# Patient Record
Sex: FEMALE | Race: WHITE | URBAN - METROPOLITAN AREA
[De-identification: names, ages, dates, MRNs, and addresses within clinical notes are randomized per-mention and may not be internally consistent; named-entity substitution may affect disease eponyms.]

---

## 2017-11-06 ENCOUNTER — THERAPY VISIT (OUTPATIENT)
Dept: PHYSICAL THERAPY | Facility: CLINIC | Age: 37
End: 2017-11-06
Payer: COMMERCIAL

## 2017-11-06 DIAGNOSIS — G89.29 CHRONIC RIGHT SHOULDER PAIN: Primary | ICD-10-CM

## 2017-11-06 DIAGNOSIS — M25.511 CHRONIC RIGHT SHOULDER PAIN: Primary | ICD-10-CM

## 2017-11-06 PROCEDURE — 97161 PT EVAL LOW COMPLEX 20 MIN: CPT | Mod: GP | Performed by: PHYSICAL THERAPIST

## 2017-11-06 PROCEDURE — 97530 THERAPEUTIC ACTIVITIES: CPT | Mod: GP | Performed by: PHYSICAL THERAPIST

## 2017-11-06 PROCEDURE — 97110 THERAPEUTIC EXERCISES: CPT | Mod: GP | Performed by: PHYSICAL THERAPIST

## 2017-11-06 NOTE — LETTER
[facility] ATHLETIC [facility] PHYSICAL THERAPY   [address] [address]  [phone]    2017    Re: Anna Flowers   :   1980  MRN:  [id number]   REFERRING PHYSICIAN: Margaret Yu MD    [facility] ATHLETIC [facility] PHYSICAL [facility]    Date of Initial Evaluation: 2017  Visits:  Rxs Used: 1  Reason for Referral:  Chronic right shoulder pain    EVALUATION SUMMARY    [facility] Initial Evaluation    Subjective:  Patient is a 37 year old female presenting with rehab right shoulder hpi.   Anna Flowers is a 37 year old female with a right shoulder condition.  Condition occurred with:  Repetition/overuse (Pt. has had gradual onset of R sh pain x 5 months w/out incident. The problem seems to coincide with having her baby dtr 5 months ago. She had a similar problem after having her 3 yo. That problem went away on it's own. No other hx of R sh injury.). Condition occurred: for unknown reasons. This is a chronic condition 17.    Patient reports pain: Anterior. Radiates to: Upper arm. Pain is described as aching and is intermittent and reported as 4/10. Associated symptoms: Loss of motion/stiffness and loss of strength. Pain is worse in the P.M. Symptoms are exacerbated by using arm overhead, lifting and carrying and relieved by NSAID's and rest. Since onset symptoms are unchanged. Special testing: none. Previous treatment: none. General health as reported by patient is excellent. Other surgeries include: Orthopedic surgery (hand surgery). Current medications: Other (advil, vitamin). Current occupation is Retail management. Primary job tasks include: Prolonged standing, lifting and other (pushing/pulling).                Objective:    Standing Alignment:      Shoulder/UE:  Normal    Flexibility/Screens:   Positive screens:  Shoulder                  Re: Anna Flowers   :   1980    Shoulder  Evaluation:  ROM:  AROM:    Flexion:  Left:  175    Right:  170    Abduction:  Left: 175   Right:  175    Internal Rotation:  Left:  85    Right:  85  External Rotation:  Left:  80    Right:  67    Strength:    Flexion: Right: 4-/5      Pain:  +  Extension:  Right: 4/5    Pain:  Abduction:  Right: 4+/5     Pain:    Internal Rotation:  Right: 5/5     Pain:  External Rotation:   Right:4-/5      Pain:+    Horizontal Abduction:  Right:4-/5    Pain:    Stability Testing:  normal    Special Tests:      Right shoulder positive for the following special tests:Impingement  Right shoulder negative for the following special tests:Labral and Rotator cuff tear  Palpation:  normal    Mobility Tests:      Glenohumeral inferior right:  Hypomobile      Assessment/Plan:      Patient is a 37 year old female with right side shoulder complaints.    Patient has the following significant findings with corresponding treatment plan.                Diagnosis 1:  R shoulder pain  Pain -  self management and education  Decreased ROM/flexibility - manual therapy and therapeutic exercise  Decreased strength - therapeutic exercise and therapeutic activities  Impaired muscle performance - neuro re-education  Decreased function - therapeutic activities    Therapy Evaluation Codes:   1) History comprised of:   Personal factors that impact the plan of care:      None.    Comorbidity factors that impact the plan of care are:      None.     Medications impacting care: None.        Re: Anna Flowers   :   1980    2) Examination of Body Systems comprised of:   Body structures and functions that impact the plan of care:      Shoulder.   Activity limitations that impact the plan of care are:      Dressing and Lifting.  3) Clinical presentation characteristics are:   Stable/Uncomplicated.  4) Decision-Making    Low complexity using standardized patient assessment instrument and/or measureable assessment of functional outcome.  Cumulative Therapy  Evaluation is: Low complexity.    Previous and current functional limitations:  (See Goal Flow Sheet for this information)    Short term and Long term goals: (See Goal Flow Sheet for this information)     Communication ability:  Patient appears to be able to clearly communicate and understand verbal and written communication and follow directions correctly.  Treatment Explanation - The following has been discussed with the patient:   RX ordered/plan of care  Anticipated outcomes  Possible risks and side effects  This patient would benefit from PT intervention to resume normal activities.   Rehab potential is good.    Frequency:  2 X a month, once daily  Duration:  for 2 months  Discharge Plan:  Achieve all LTG.  Independent in home treatment program.  Reach maximal therapeutic benefit.        Thank you for your referral.        INQUIRIES  Therapist: Viviana Waddell    [facility]  [address] [place] [id number]  Phone: [phone]  Fax: [phone]

## 2017-11-06 NOTE — MR AVS SNAPSHOT
"              After Visit Summary   11/6/2017    Anna Flowers    MRN: [id number]           Patient Information     Date Of Birth          1980        Visit Information        Provider Department      11/6/2017 4:40 PM [facility]addell [facility] Physical Therapy        Today's Diagnoses     Chronic right shoulder pain    -  1       Follow-ups after your visit        Your next 10 appointments already scheduled     Nov 22, 2017  9:30 AM CST   LUANA Extremity with Pina Laurent PTA   [facility] Physical Therapy ([facility]  )    [address] [place] [address] [phone]              Who to contact     If you have questions or need follow up information about today's clinic visit or your schedule please contact [facility] PHYSICAL [facility] directly at [phone].  Normal or non-critical lab and imaging results will be communicated to you by MyChart, letter or phone within 4 business days after the clinic has received the results. If you do not hear from us within 7 days, please contact the clinic through [employer] or phone. If you have a critical or abnormal lab result, we will notify you by phone as soon as possible.  Submit refill requests through [employer] or call your pharmacy and they will forward the refill request to us. Please allow 3 business days for your refill to be completed.          Additional Information About Your Visit        MyChart Information     [employer] lets you send messages to your doctor, view your test results, renew your prescriptions, schedule appointments and more. To sign up, go to [email or web address] . Click on \"Log in\" on the left side of the screen, which will take you to the Welcome page. Then click on \"Sign up Now\" on the right side of the page.     You will be asked to enter the access code listed below, as well as some personal information. Please " follow the directions to create your username and password.     Your access code is: [email or web address]  Expires: 2018  6:16 PM     Your access code will  in 90 days. If you need help or a new code, please call your [facility] clinic or [phone].        Care EveryWhere ID     This is your Care EveryWhere ID. This could be used by other organizations to access your [facility] medical records  [id number]         Blood Pressure from Last 3 Encounters:   No data found for BP    Weight from Last 3 Encounters:   No data found for Wt              We Performed the Following     LUANA Inital Eval Report     PT Eval, Low Complexity (12181)     Therapeutic Activities     Therapeutic Exercises        Primary Care Provider Office Phone # Fax #    [facility] [phone]       [facility] [address] [facility] [address]        Equal Access to Services     [facility]: Hadii aad ku hadasho Soomaali, waaxda luqadaha, qaybta kaalmada adeegyada, waxay idiin hayaan adefrancisco kharaanthony helm . So [facility] [phone].    ATENCIÓN: Si habla español, tiene a tillman disposición servicios gratuitos de asistencia lingüística. Llame al [phone].    We comply with applicable federal civil rights laws and [place] laws. We do not discriminate on the basis of race, color, national origin, age, disability, sex, sexual orientation, or gender identity.            Thank you!     Thank you for choosing [facility] PHYSICAL THERAPY  for your care. Our goal is always to provide you with excellent care. Hearing back from our patients is one way we can continue to improve our services. Please take a few minutes to complete the written survey that you may receive in the mail after your visit with us. Thank you!             Your Updated Medication List - Protect others around you: Learn how to safely use, store and throw away your medicines at www.disposemymeds.org.      Notice  As of 2017   6:16 PM    You have not been prescribed any medications.

## 2017-11-07 NOTE — PROGRESS NOTES
[facility] Initial Evaluation          Subjective:    Patient is a 37 year old female presenting with rehab right shoulder hpi.   Anna Flowers is a 37 year old female with a right shoulder condition.  Condition occurred with:  Repetition/overuse (Pt. has had gradual onset of R sh pain x 5 months w/out incident. The problem seems to coincide with having her baby dtr 5 months ago. She had a similar problem after having her 2 yo. That problem went away on it's own. No other hx of R sh injury.).  Condition occurred: for unknown reasons.  This is a chronic condition  6-6-17.    Patient reports pain:  Anterior.  Radiates to:  Upper arm.  Pain is described as aching and is intermittent and reported as 4/10.  Associated symptoms:  Loss of motion/stiffness and loss of strength. Pain is worse in the P.M..  Symptoms are exacerbated by using arm overhead, lifting and carrying and relieved by NSAID's and rest.  Since onset symptoms are unchanged.  Special testing: none.  Previous treatment: none.    General health as reported by patient is excellent.                                              Objective:    Standing Alignment:      Shoulder/UE:  Normal                  Flexibility/Screens:   Positive screens:  Shoulder                             Shoulder Evaluation:  ROM:  AROM:    Flexion:  Left:  175    Right:  170    Abduction:  Left: 175   Right:  175    Internal Rotation:  Left:  85    Right:  85  External Rotation:  Left:  80    Right:  67                      Strength:    Flexion: Right: 4-/5      Pain:  +  Extension:  Right: 4/5    Pain:  Abduction:  Right: 4+/5     Pain:    Internal Rotation:  Right: 5/5     Pain:  External Rotation:   Right:4-/5      Pain:+    Horizontal Abduction:  Right:4-/5    Pain:        Stability Testing:  normal      Special Tests:      Right shoulder positive for the following special tests:Impingement  Right shoulder negative for the following special tests:Labral  and Rotator cuff tear  Palpation:  normal      Mobility Tests:        Glenohumeral inferior right:  Hypomobile                                             General     ROS    Assessment/Plan:      Patient is a 37 year old female with right side shoulder complaints.    Patient has the following significant findings with corresponding treatment plan.                Diagnosis 1:  R shoulder pain  Pain -  self management and education  Decreased ROM/flexibility - manual therapy and therapeutic exercise  Decreased strength - therapeutic exercise and therapeutic activities  Impaired muscle performance - neuro re-education  Decreased function - therapeutic activities    Therapy Evaluation Codes:   1) History comprised of:   Personal factors that impact the plan of care:      None.    Comorbidity factors that impact the plan of care are:      None.     Medications impacting care: None.  2) Examination of Body Systems comprised of:   Body structures and functions that impact the plan of care:      Shoulder.   Activity limitations that impact the plan of care are:      Dressing and Lifting.  3) Clinical presentation characteristics are:   Stable/Uncomplicated.  4) Decision-Making    Low complexity using standardized patient assessment instrument and/or measureable assessment of functional outcome.  Cumulative Therapy Evaluation is: Low complexity.    Previous and current functional limitations:  (See Goal Flow Sheet for this information)    Short term and Long term goals: (See Goal Flow Sheet for this information)     Communication ability:  Patient appears to be able to clearly communicate and understand verbal and written communication and follow directions correctly.  Treatment Explanation - The following has been discussed with the patient:   RX ordered/plan of care  Anticipated outcomes  Possible risks and side effects  This patient would benefit from PT intervention to resume normal activities.   Rehab potential is  good.    Frequency:  2 X a month, once daily  Duration:  for 2 months  Discharge Plan:  Achieve all LTG.  Independent in home treatment program.  Reach maximal therapeutic benefit.    Please refer to the daily flowsheet for treatment today, total treatment time and time spent performing 1:1 timed codes.

## 2017-11-07 NOTE — PROGRESS NOTES
Subjective:    Patient is a 37 year old female presenting with rehab left ankle/foot hpi.                                          Other surgeries include:  Orthopedic surgery (hand surgery).  Current medications:  Other (advil, vitamin).  Current occupation is Retail management.    Primary job tasks include:  Prolonged standing, lifting and other (pushing/pulling).                                Objective:    System    Physical Exam    General     ROS    Assessment/Plan:

## 2023-04-23 ENCOUNTER — HEALTH MAINTENANCE LETTER (OUTPATIENT)
Age: 43
End: 2023-04-23

## 2023-11-13 ENCOUNTER — LAB REQUISITION (OUTPATIENT)
Dept: LAB | Facility: CLINIC | Age: 43
End: 2023-11-13

## 2023-11-13 DIAGNOSIS — N93.9 ABNORMAL UTERINE AND VAGINAL BLEEDING, UNSPECIFIED: ICD-10-CM

## 2023-11-13 LAB
ERYTHROCYTE [DISTWIDTH] IN BLOOD BY AUTOMATED COUNT: 13 % (ref 10–15)
HCT VFR BLD AUTO: 40.3 % (ref 35–47)
HGB BLD-MCNC: 13 G/DL (ref 11.7–15.7)
HOLD SPECIMEN: NORMAL
MCH RBC QN AUTO: 31 PG (ref 26.5–33)
MCHC RBC AUTO-ENTMCNC: 32.3 G/DL (ref 31.5–36.5)
MCV RBC AUTO: 96 FL (ref 78–100)
PLATELET # BLD AUTO: 225 10E3/UL (ref 150–450)
RBC # BLD AUTO: 4.2 10E6/UL (ref 3.8–5.2)
WBC # BLD AUTO: 5.4 10E3/UL (ref 4–11)

## 2023-11-13 PROCEDURE — 82670 ASSAY OF TOTAL ESTRADIOL: CPT | Performed by: OBSTETRICS & GYNECOLOGY

## 2023-11-13 PROCEDURE — 85027 COMPLETE CBC AUTOMATED: CPT | Performed by: OBSTETRICS & GYNECOLOGY

## 2023-11-13 PROCEDURE — 84443 ASSAY THYROID STIM HORMONE: CPT | Performed by: OBSTETRICS & GYNECOLOGY

## 2023-11-13 PROCEDURE — 84702 CHORIONIC GONADOTROPIN TEST: CPT | Performed by: OBSTETRICS & GYNECOLOGY

## 2023-11-13 PROCEDURE — 84146 ASSAY OF PROLACTIN: CPT | Performed by: OBSTETRICS & GYNECOLOGY

## 2023-11-14 LAB
ESTRADIOL SERPL-MCNC: 168 PG/ML
HCG INTACT+B SERPL-ACNC: <1 MIU/ML
PROLACTIN SERPL 3RD IS-MCNC: 22 NG/ML (ref 5–23)
TSH SERPL DL<=0.005 MIU/L-ACNC: 3.31 UIU/ML (ref 0.3–4.2)

## 2024-04-21 ENCOUNTER — HEALTH MAINTENANCE LETTER (OUTPATIENT)
Age: 44
End: 2024-04-21

## 2025-06-22 ENCOUNTER — HEALTH MAINTENANCE LETTER (OUTPATIENT)
Age: 45
End: 2025-06-22